# Patient Record
(demographics unavailable — no encounter records)

---

## 2025-07-10 NOTE — ASSESSMENT
[FreeTextEntry1] : Palpitations:  -Check MCOT 2 weeks.  -Check TTE and ETT, TSH.   MR:  -Check TTE.   Cardiac screening:  -Check labs.   Follow up in 3 months.

## 2025-07-10 NOTE — HISTORY OF PRESENT ILLNESS
[FreeTextEntry1] : Mr. Looney is a 24yo M with PMHx of palpitations who presents to [facility] care. No PMD . He has been having palpitations for one year, happening daily. Mostly at night and will wake him from sleep at times. He has some chest discomfort and SOB when having palpitations. He also feels better with exertion.

## 2025-07-10 NOTE — HISTORY OF PRESENT ILLNESS
[FreeTextEntry1] : Mr. Looney is a 26yo M with PMHx of palpitations who presents to [facility]. No PMD . He has been having palpitations for one year, happening daily. Mostly at night and will wake him from sleep at times. He has some chest discomfort and SOB when having palpitations. He also feels better with exertion.

## 2025-07-10 NOTE — REASON FOR VISIT
[Other: ____] : [unfilled] [FreeTextEntry1] : Diagnostic Tests: ------------------------ EK/10/25-NSR. rSr' in V1-2. TWF.  18-NSR. Normal EKG.